# Patient Record
Sex: MALE | Race: WHITE | NOT HISPANIC OR LATINO
[De-identification: names, ages, dates, MRNs, and addresses within clinical notes are randomized per-mention and may not be internally consistent; named-entity substitution may affect disease eponyms.]

---

## 2019-07-10 ENCOUNTER — FORM ENCOUNTER (OUTPATIENT)
Age: 69
End: 2019-07-10

## 2019-07-11 ENCOUNTER — OUTPATIENT (OUTPATIENT)
Dept: OUTPATIENT SERVICES | Facility: HOSPITAL | Age: 69
LOS: 1 days | End: 2019-07-11
Payer: COMMERCIAL

## 2019-07-11 ENCOUNTER — APPOINTMENT (OUTPATIENT)
Dept: ORTHOPEDIC SURGERY | Facility: CLINIC | Age: 69
End: 2019-07-11
Payer: COMMERCIAL

## 2019-07-11 VITALS — OXYGEN SATURATION: 98 % | SYSTOLIC BLOOD PRESSURE: 125 MMHG | HEART RATE: 92 BPM | DIASTOLIC BLOOD PRESSURE: 85 MMHG

## 2019-07-11 VITALS — HEIGHT: 68 IN | BODY MASS INDEX: 25.46 KG/M2 | WEIGHT: 168 LBS

## 2019-07-11 DIAGNOSIS — N30.90 CYSTITIS, UNSPECIFIED W/OUT HEMATURIA: ICD-10-CM

## 2019-07-11 DIAGNOSIS — M25.461 EFFUSION, RIGHT KNEE: ICD-10-CM

## 2019-07-11 PROCEDURE — 20610 DRAIN/INJ JOINT/BURSA W/O US: CPT | Mod: RT

## 2019-07-11 PROCEDURE — 73564 X-RAY EXAM KNEE 4 OR MORE: CPT | Mod: 26,50

## 2019-07-11 PROCEDURE — 73564 X-RAY EXAM KNEE 4 OR MORE: CPT

## 2019-07-11 PROCEDURE — 99203 OFFICE O/P NEW LOW 30 MIN: CPT | Mod: 25

## 2019-07-18 PROBLEM — M25.461 EFFUSION OF RIGHT KNEE: Status: ACTIVE | Noted: 2019-07-18

## 2019-07-18 PROBLEM — N30.90 DIVERTICULITIS, BLADDER: Status: ACTIVE | Noted: 2019-07-11

## 2019-07-18 NOTE — PHYSICAL EXAM
[de-identified] : The patient is a well developed, well nourished male in no apparent distress. She is alert and oriented X 3 with a pleasant mood and appropriate affect. \par \par On physical examination of the right knee, his ROM is 0-120 degrees The patient walks with a normal gait and stands in neutral alignment. There is trace effusion. No warmth or erythema is noted. The patella is non tender to palpation medially or laterally. There is no crepitus noted. The apprehension and grind tests are negative. The extensor mechanism is intact. There is medial joint line tenderness. The Christine sign is positive. The Lachman and pivot shift tests are negative. There is no varus or valgus laxity at 0 or 30 degrees. No posterolateral or anteromedial laxity is noted. No masses are palpable. No other soft tissue or bony tenderness is noted. Quadriceps weakness is noted. Neurovascular function is intact.  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

## 2019-07-18 NOTE — HISTORY OF PRESENT ILLNESS
[de-identified] : Prince Ramirez is a 69 yo gentleman with ongoing right knee issues since May. he denies any acute trauma or specific injury. He developed diffuse knee pain and stiffness. He has had difficulty with stairs and pain when rising from a seated position. He denies any previous knee issues. He denies any locking or buckling. He has tried rest and ice without much relief.

## 2019-07-18 NOTE — DISCUSSION/SUMMARY
[de-identified] : Mr Ramirez has mild medial compartment DJD. He received a cortisone injection today. He will slolwy increase his activities as tolerated. he will return on an as needed basis. ALl questions were answered. he will call if any issues arise.

## 2019-07-18 NOTE — PROCEDURE
[de-identified] : Under strict sterile technique, the right knee was prepped with Betadine. Using the superolateral approach, with the patient supine, 1ml of Kenalog was mixed with 5mls of 1% Lidocaine and 5mLs of 0.5% Marcaine, and was injected intraarticularly. The patient tolerated the procedure well. The patient was instructed to avoid vigorous exercise for 24 hours and will apply ice to the knee for 20 minutes 2-3 times per day if discomfort occurs. Patient will return on an as needed basis. The patient will call if any questions or problems should arise

## 2019-07-18 NOTE — END OF VISIT
[FreeTextEntry3] : All medical record entries made by DEMETRICE Rivers, acting as a scribe for this encounter under the direction of Damián Girard MD . I have reviewed the chart and agree that the record accurately reflects my personal performance of the history, physical exam, assessment and plan. I have also personally directed, reviewed, and agreed with the chart.

## 2019-08-22 ENCOUNTER — RX RENEWAL (OUTPATIENT)
Age: 69
End: 2019-08-22

## 2019-09-20 ENCOUNTER — APPOINTMENT (OUTPATIENT)
Dept: ORTHOPEDIC SURGERY | Facility: CLINIC | Age: 69
End: 2019-09-20
Payer: COMMERCIAL

## 2019-09-20 DIAGNOSIS — Z87.19 PERSONAL HISTORY OF OTHER DISEASES OF THE DIGESTIVE SYSTEM: ICD-10-CM

## 2019-09-20 DIAGNOSIS — Z78.9 OTHER SPECIFIED HEALTH STATUS: ICD-10-CM

## 2019-09-20 DIAGNOSIS — S83.207A UNSPECIFIED TEAR OF UNSPECIFIED MENISCUS, CURRENT INJURY, LEFT KNEE, INITIAL ENCOUNTER: ICD-10-CM

## 2019-09-20 DIAGNOSIS — S83.206A UNSPECIFIED TEAR OF UNSPECIFIED MENISCUS, CURRENT INJURY, RIGHT KNEE, INITIAL ENCOUNTER: ICD-10-CM

## 2019-09-20 PROCEDURE — 20610 DRAIN/INJ JOINT/BURSA W/O US: CPT | Mod: RT

## 2019-09-20 RX ORDER — FLUTICASONE PROPIONATE 50 UG/1
50 SPRAY, METERED NASAL
Qty: 16 | Refills: 0 | Status: ACTIVE | COMMUNITY
Start: 2018-09-17

## 2019-09-20 RX ORDER — FLUOCINOLONE ACETONIDE 0.25 MG/G
0.03 OINTMENT TOPICAL
Qty: 15 | Refills: 0 | Status: ACTIVE | COMMUNITY
Start: 2019-06-13

## 2019-09-20 RX ORDER — PROPRANOLOL HYDROCHLORIDE 20 MG/1
20 TABLET ORAL
Qty: 30 | Refills: 0 | Status: ACTIVE | COMMUNITY
Start: 2019-05-01

## 2019-09-20 RX ORDER — KETOCONAZOLE 20 MG/G
2 CREAM TOPICAL
Qty: 30 | Refills: 0 | Status: ACTIVE | COMMUNITY
Start: 2019-05-31

## 2019-09-20 RX ORDER — BUPROPION HYDROCHLORIDE 150 MG/1
150 TABLET, EXTENDED RELEASE ORAL
Qty: 60 | Refills: 0 | Status: ACTIVE | COMMUNITY
Start: 2019-06-27

## 2019-09-20 RX ORDER — VALACYCLOVIR 1 G/1
1 TABLET, FILM COATED ORAL
Qty: 5 | Refills: 0 | Status: ACTIVE | COMMUNITY
Start: 2019-05-24

## 2019-09-20 RX ORDER — HYDROCORTISONE 25 MG/G
2.5 CREAM TOPICAL
Qty: 30 | Refills: 0 | Status: ACTIVE | COMMUNITY
Start: 2019-05-31

## 2019-09-20 RX ORDER — LORAZEPAM 0.5 MG/1
0.5 TABLET ORAL
Qty: 60 | Refills: 0 | Status: ACTIVE | COMMUNITY
Start: 2019-05-01

## 2019-09-20 RX ORDER — AZELASTINE HYDROCHLORIDE 0.5 MG/ML
0.05 SOLUTION/ DROPS OPHTHALMIC
Qty: 6 | Refills: 0 | Status: ACTIVE | COMMUNITY
Start: 2019-06-17

## 2019-09-20 RX ORDER — CHLORHEXIDINE GLUCONATE, 0.12% ORAL RINSE 1.2 MG/ML
0.12 SOLUTION DENTAL
Qty: 473 | Refills: 0 | Status: ACTIVE | COMMUNITY
Start: 2019-05-13

## 2019-09-27 ENCOUNTER — APPOINTMENT (OUTPATIENT)
Dept: ORTHOPEDIC SURGERY | Facility: CLINIC | Age: 69
End: 2019-09-27
Payer: COMMERCIAL

## 2019-09-27 PROCEDURE — 20610 DRAIN/INJ JOINT/BURSA W/O US: CPT | Mod: RT

## 2019-10-01 NOTE — PROCEDURE
[de-identified] : Indication: right knee pain and stiffness\par \par Under strict sterile technique, the right knee was prepped with Betadine. Using the superolateral approach, with the patient supine, a 2mL injection of Euflexxa was administered intra-articularly . The patient tolerated the procedure well. The patient was instructed to avoid vigorous exercise for 24 hours and will apply ice to the knee for 20 minutes 2-3 times per day if discomfort occurs. Patient will return next week for the next injection in the series. The patient will call if any questions or problems should arise. \par \par Euflexxa injection # 1 - Right knee joint\par Lot #: [id number]\par Exp: 09-\par Man: Dionna\par NDC: [id number]

## 2019-10-03 NOTE — PROCEDURE
[de-identified] : Indication: right knee pain and stiffness\par \par Under strict sterile technique, the right knee was prepped with Betadine. Using the superolateral approach, with the patient supine, a 2mL injection of Euflexxa was administered intra-articularly . The patient tolerated the procedure well. The patient was instructed to avoid vigorous exercise for 24 hours and will apply ice to the knee for 20 minutes 2-3 times per day if discomfort occurs. Patient will return next week for the next injection in the series. The patient will call if any questions or problems should arise. \par \par Euflexxa injection #2 - Right knee joint\par Lot #: [id number]\par Exp: 09-\par Man: Dionna\par NDC: [id number]

## 2019-10-04 ENCOUNTER — APPOINTMENT (OUTPATIENT)
Dept: ORTHOPEDIC SURGERY | Facility: CLINIC | Age: 69
End: 2019-10-04
Payer: COMMERCIAL

## 2019-10-04 VITALS
SYSTOLIC BLOOD PRESSURE: 120 MMHG | BODY MASS INDEX: 25.46 KG/M2 | DIASTOLIC BLOOD PRESSURE: 80 MMHG | HEIGHT: 68 IN | WEIGHT: 168 LBS

## 2019-10-04 DIAGNOSIS — M17.11 UNILATERAL PRIMARY OSTEOARTHRITIS, RIGHT KNEE: ICD-10-CM

## 2019-10-04 PROCEDURE — 20610 DRAIN/INJ JOINT/BURSA W/O US: CPT | Mod: RT

## 2019-10-04 RX ORDER — HYALURONATE SODIUM 20 MG/2 ML
20 SYRINGE (ML) INTRAARTICULAR
Qty: 1 | Refills: 0 | Status: COMPLETED | COMMUNITY
Start: 2019-08-22 | End: 2019-10-04

## 2019-10-09 PROBLEM — M17.11 PRIMARY OSTEOARTHRITIS OF RIGHT KNEE: Status: ACTIVE | Noted: 2019-08-22

## 2019-10-09 NOTE — PROCEDURE
[de-identified] : Indication: Right knee pain and stiffness\par \par Under strict sterile technique, the right knee was prepped with Betadine. Using the superolateral approach, with the patient supine, a 2mL injection of Euflexxa was administered intra-articularly . The patient tolerated the procedure well. The patient was instructed to avoid vigorous exercise for 24 hours and will apply ice to the knee for 20 minutes 2-3 times per day if discomfort occurs. Patient will return next week for the next injection in the series. The patient will call if any questions or problems should arise. \par \par Euflexxa injection # 3- Right knee joint\par Lot #: [id number]\par Exp: 09-\par Man: Antonio\par NDC: [id number]